# Patient Record
Sex: FEMALE | URBAN - METROPOLITAN AREA
[De-identification: names, ages, dates, MRNs, and addresses within clinical notes are randomized per-mention and may not be internally consistent; named-entity substitution may affect disease eponyms.]

---

## 2021-09-15 ENCOUNTER — TELEPHONE (OUTPATIENT)
Dept: UROLOGY | Facility: CLINIC | Age: 71
End: 2021-09-15

## 2021-09-15 NOTE — TELEPHONE ENCOUNTER
Pt called in to check on the status of the referral. They were referred by Dr. Kal Doran. Records are in the chart under media tab. Had a CT scan with some findings but I am not sure as to the urgency of an appt. Can you take a look and advise me on a time frame for an appt? Ok to leave a message on machine.      [phone]

## 2021-09-16 NOTE — TELEPHONE ENCOUNTER
PER [facility] FIRST AVAILABLE OKAY. SCHEDULED PT FOR 11/10/21. CALLED PT AND LET HER KNOW. SHE VOICED UNDERSTANDING.

## 2022-01-10 ENCOUNTER — TELEPHONE (OUTPATIENT)
Dept: UROLOGY | Facility: CLINIC | Age: 72
End: 2022-01-10

## 2022-01-10 NOTE — TELEPHONE ENCOUNTER
DELETE AFTER REVIEWING: Telephone encounter to be sent to the clinical pool   Hub staff attempted to follow warm transfer process and was unsuccessful     Caller: Savannah Solares    Relationship to patient: Self    Best call back number: [phone]    Patient is needing: PT NEEDS TO RESCHEDULE THE APPT THEY HAVE FOR TODAY DUE TO NO RIDE. THEY WOULD LIKE AN APPT ON Friday AROUND THE SAME TIME THEIR FRIEND HAS AN APPT SO THAT SHE WILL HAVE A RIDE. PLEASE CALL HER BACK AS SOON AS POSSIBLE.

## 2022-01-25 ENCOUNTER — TELEPHONE (OUTPATIENT)
Dept: UROLOGY | Facility: CLINIC | Age: 72
End: 2022-01-25

## 2022-01-25 NOTE — TELEPHONE ENCOUNTER
PT SIRI'D HER APPT 1/10/22, CALLED REFERRING PROVIDER LILY HOFFMAN'S OFFICE AND LEFT A DETAILED MESSAGE ON [facility] THAT DORA MERA